# Patient Record
Sex: MALE | Race: OTHER | URBAN - METROPOLITAN AREA
[De-identification: names, ages, dates, MRNs, and addresses within clinical notes are randomized per-mention and may not be internally consistent; named-entity substitution may affect disease eponyms.]

---

## 2017-01-20 ENCOUNTER — OUTPATIENT (OUTPATIENT)
Dept: OUTPATIENT SERVICES | Facility: HOSPITAL | Age: 47
LOS: 1 days | End: 2017-01-20
Payer: OTHER MISCELLANEOUS

## 2017-01-20 PROCEDURE — 73501 X-RAY EXAM HIP UNI 1 VIEW: CPT

## 2017-01-20 PROCEDURE — 73502 X-RAY EXAM HIP UNI 2-3 VIEWS: CPT | Mod: 26

## 2018-07-13 ENCOUNTER — OUTPATIENT (OUTPATIENT)
Dept: OUTPATIENT SERVICES | Facility: HOSPITAL | Age: 48
LOS: 1 days | End: 2018-07-13
Payer: OTHER MISCELLANEOUS

## 2018-07-13 PROCEDURE — 73502 X-RAY EXAM HIP UNI 2-3 VIEWS: CPT | Mod: 26,RT

## 2018-07-13 PROCEDURE — 73502 X-RAY EXAM HIP UNI 2-3 VIEWS: CPT

## 2020-10-01 ENCOUNTER — APPOINTMENT (OUTPATIENT)
Dept: NEUROSURGERY | Facility: CLINIC | Age: 50
End: 2020-10-01

## 2020-11-12 ENCOUNTER — APPOINTMENT (OUTPATIENT)
Dept: NEUROSURGERY | Facility: CLINIC | Age: 50
End: 2020-11-12
Payer: MEDICARE

## 2020-11-12 VITALS
DIASTOLIC BLOOD PRESSURE: 63 MMHG | OXYGEN SATURATION: 95 % | HEIGHT: 63 IN | WEIGHT: 155 LBS | TEMPERATURE: 98.8 F | SYSTOLIC BLOOD PRESSURE: 104 MMHG | HEART RATE: 95 BPM | BODY MASS INDEX: 27.46 KG/M2

## 2020-11-12 DIAGNOSIS — Z78.9 OTHER SPECIFIED HEALTH STATUS: ICD-10-CM

## 2020-11-12 DIAGNOSIS — R52 PAIN, UNSPECIFIED: ICD-10-CM

## 2020-11-12 PROCEDURE — 99203 OFFICE O/P NEW LOW 30 MIN: CPT

## 2020-11-24 PROBLEM — R52 PAIN, ACUTE: Status: ACTIVE | Noted: 2020-11-24

## 2020-11-24 PROBLEM — Z78.9 NO PERTINENT PAST MEDICAL HISTORY: Status: RESOLVED | Noted: 2020-11-24 | Resolved: 2020-11-24

## 2020-11-24 NOTE — HISTORY OF PRESENT ILLNESS
[> 3 months] : more  than 3 months [FreeTextEntry1] : low back pain [de-identified] : He has history of low back and right leg pain. No numbness, no tingling. He has done PT a pain management with no long  lasting benefit. Worse with activity and sitting for too long and changing positions. He is able to walk about 2 to 3 blocks..   He had right knee and hip surgery as well.

## 2020-11-24 NOTE — ASSESSMENT
[FreeTextEntry1] : At this time I think we should concentrate on conservative therapy. We will discuss surgery if the pain gets worse.

## 2020-11-24 NOTE — PHYSICAL EXAM
[No Visual Abnormalities] : no visible abnormailities [No Tenderness to Palpation] : no spine tenderness on palpation [Full ROM] : full ROM [No Pain with ROM] : no pain with motion in any direction [Straight-Leg Raise Test - Left] : straight leg raise of the left leg was positive [Straight-Leg Raise Test - Right] : straight leg raise of the right leg was positive [] : negative on the right [Normal] : normal [Able to toe walk] : the patient was able to toe walk [Able to heel walk] : the patient was able to heel walk [Intact] : all reflexes within normal limits bilaterally [L'Hermitte's] : neck flexion did not produce tingling down the spine/arms [Spurling's - Opposite Side] : Negative Spurling's on opposite side [Spurling's Same Side] : Negative Spurling's on same side

## 2024-04-30 ENCOUNTER — APPOINTMENT (OUTPATIENT)
Dept: RHEUMATOLOGY | Facility: CLINIC | Age: 54
End: 2024-04-30
Payer: MEDICARE

## 2024-04-30 VITALS
SYSTOLIC BLOOD PRESSURE: 124 MMHG | HEART RATE: 64 BPM | BODY MASS INDEX: 26.05 KG/M2 | RESPIRATION RATE: 15 BRPM | DIASTOLIC BLOOD PRESSURE: 77 MMHG | WEIGHT: 147 LBS | TEMPERATURE: 98.9 F | OXYGEN SATURATION: 98 % | HEIGHT: 63 IN

## 2024-04-30 DIAGNOSIS — E78.5 HYPERLIPIDEMIA, UNSPECIFIED: ICD-10-CM

## 2024-04-30 DIAGNOSIS — M25.50 PAIN IN UNSPECIFIED JOINT: ICD-10-CM

## 2024-04-30 DIAGNOSIS — Z78.9 OTHER SPECIFIED HEALTH STATUS: ICD-10-CM

## 2024-04-30 PROCEDURE — 99204 OFFICE O/P NEW MOD 45 MIN: CPT

## 2024-04-30 RX ORDER — CELECOXIB 200 MG/1
200 CAPSULE ORAL
Refills: 0 | Status: ACTIVE | COMMUNITY
Start: 2024-04-30

## 2024-04-30 RX ORDER — ATORVASTATIN CALCIUM 20 MG/1
20 TABLET, FILM COATED ORAL
Refills: 0 | Status: ACTIVE | COMMUNITY
Start: 2024-04-30

## 2024-04-30 RX ORDER — GABAPENTIN 400 MG/1
400 CAPSULE ORAL
Refills: 0 | Status: ACTIVE | COMMUNITY
Start: 2024-04-30

## 2024-04-30 RX ORDER — AMITRIPTYLINE HYDROCHLORIDE 10 MG/1
10 TABLET, FILM COATED ORAL
Refills: 0 | Status: ACTIVE | COMMUNITY
Start: 2024-04-30

## 2024-04-30 RX ORDER — TIZANIDINE 2 MG/1
2 TABLET ORAL
Refills: 0 | Status: ACTIVE | COMMUNITY
Start: 2024-04-30

## 2024-04-30 RX ORDER — PANTOPRAZOLE 40 MG/1
40 TABLET, DELAYED RELEASE ORAL
Refills: 0 | Status: ACTIVE | COMMUNITY
Start: 2024-04-30

## 2024-04-30 NOTE — DATA REVIEWED
[FreeTextEntry1] : MRI lumbar spine reviewed from 7/20 disc bulging  MRI cervical spine reviewed from 3/20 DDD  PET scan reviewed from 4/24 findings w mild increase radionuclide uptake consistent w degenerative joint dz

## 2024-04-30 NOTE — ASSESSMENT
[FreeTextEntry1] : 54 yo M w polyarthralgias =upper back, lower back, MCPs, PIPs, elbows, knees =worse w activities, worse in pm  =dry eyes, dry mouth  =MRI lumbar 7/20 disc bulging  =MRI cervical 3/20 DDD =PET scan 4/24 consistent w degenerative joint dz  Symptoms likely all OA, but will r/o inflammatory/AI dz.   Plan -Labs w serologies, inflammatory markers -Xrays SI joint/ thoracic, hands/wrists, knees RTO depending on above results

## 2024-04-30 NOTE — CONSULT LETTER
[Dear  ___] : Dear  [unfilled], [Consult Letter:] : I had the pleasure of evaluating your patient, [unfilled]. [Consult Closing:] : Thank you very much for allowing me to participate in the care of this patient.  If you have any questions, please do not hesitate to contact me. [Sincerely,] : Sincerely, [FreeTextEntry2] : Dr. Stuart Floyd [phone] [place], [place] [id number] [FreeTextEntry3] : Wei Bill MD

## 2024-04-30 NOTE — HISTORY OF PRESENT ILLNESS
[FreeTextEntry1] : 53 M here for consultation  Pt reports pain in upper back, lower back, MCPs, PIPs, elbows, knees teeth? took abx Difficulty getting up, feel stuck Unsure of stiffness, no swelling Worse w activities; worse in pm Pt has tried gabapentin, tizanidine, celecoxib, which helps patient; although says one of the medications give him dizziness   No fevers, h/a, rashes, hair loss, oral ulcers, epistaxis, sinusitis,  swollen glands,  CP, SOB, cough, vision changes, abdominal pain, GERD, n/v/d, blood in stool or urine, focal weakness, sensory loss, Raynaud's, joint pain, swelling, weight loss.  +dry eyes, uses artificial tears; dry mouth

## 2024-04-30 NOTE — PHYSICAL EXAM
[General Appearance - Well Nourished] : well nourished [General Appearance - Well Developed] : well developed [Sclera] : the sclera and conjunctiva were normal [Auscultation Breath Sounds / Voice Sounds] : lungs were clear to auscultation bilaterally [Heart Rate And Rhythm] : heart rate was normal and rhythm regular [Heart Sounds] : normal S1 and S2 [Murmurs] : no murmurs [Abdomen Tenderness] : non-tender [Cervical Lymph Nodes Enlarged Posterior Bilaterally] : posterior cervical [Cervical Lymph Nodes Enlarged Anterior Bilaterally] : anterior cervical [Supraclavicular Lymph Nodes Enlarged Bilaterally] : supraclavicular [Musculoskeletal - Swelling] : no joint swelling seen [] : no rash [Skin Lesions] : no skin lesions [Oriented To Time, Place, And Person] : oriented to person, place, and time

## 2024-05-06 ENCOUNTER — NON-APPOINTMENT (OUTPATIENT)
Age: 54
End: 2024-05-06

## 2024-05-13 DIAGNOSIS — R74.8 ABNORMAL LEVELS OF OTHER SERUM ENZYMES: ICD-10-CM

## 2024-05-13 LAB
ALBUMIN SERPL ELPH-MCNC: 4.5 G/DL
ALP BLD-CCNC: 87 U/L
ALT SERPL-CCNC: 59 U/L
ANA SER IF-ACNC: NEGATIVE
ANION GAP SERPL CALC-SCNC: 14 MMOL/L
APPEARANCE: CLEAR
AST SERPL-CCNC: 91 U/L
BACTERIA: NEGATIVE /HPF
BASOPHILS # BLD AUTO: 0.05 K/UL
BASOPHILS NFR BLD AUTO: 1.1 %
BILIRUB SERPL-MCNC: 0.3 MG/DL
BILIRUBIN URINE: NEGATIVE
BLOOD URINE: NEGATIVE
BUN SERPL-MCNC: 15 MG/DL
C3 SERPL-MCNC: 123 MG/DL
C4 SERPL-MCNC: 22 MG/DL
CALCIUM SERPL-MCNC: 9.5 MG/DL
CAST: 0 /LPF
CCP AB SER IA-ACNC: <8 UNITS
CHLORIDE SERPL-SCNC: 103 MMOL/L
CK SERPL-CCNC: 3814 U/L
CO2 SERPL-SCNC: 23 MMOL/L
COLOR: YELLOW
CREAT SERPL-MCNC: 0.89 MG/DL
CREAT SPEC-SCNC: 54 MG/DL
CREAT/PROT UR: 0.1 RATIO
CRP SERPL-MCNC: <3 MG/L
DSDNA AB SER-ACNC: 7 IU/ML
EGFR: 104 ML/MIN/1.73M2
ENA RNP AB SER IA-ACNC: <0.2 AL
ENA SM AB SER IA-ACNC: <0.2 AL
ENA SS-A AB SER IA-ACNC: <0.2 AL
ENA SS-B AB SER IA-ACNC: <0.2 AL
EOSINOPHIL # BLD AUTO: 0.13 K/UL
EOSINOPHIL NFR BLD AUTO: 3 %
EPITHELIAL CELLS: 0 /HPF
ERYTHROCYTE [SEDIMENTATION RATE] IN BLOOD BY WESTERGREN METHOD: 12 MM/HR
G6PD SER-CCNC: 11.7 U/G HGB
GLUCOSE QUALITATIVE U: NEGATIVE MG/DL
GLUCOSE SERPL-MCNC: 116 MG/DL
HCT VFR BLD CALC: 39.8 %
HGB BLD-MCNC: 13.9 G/DL
HLA-B27 QL NAA+PROBE: NORMAL
IMM GRANULOCYTES NFR BLD AUTO: 0.2 %
KETONES URINE: NEGATIVE MG/DL
LEUKOCYTE ESTERASE URINE: NEGATIVE
LYMPHOCYTES # BLD AUTO: 2 K/UL
LYMPHOCYTES NFR BLD AUTO: 45.6 %
MAN DIFF?: NORMAL
MCHC RBC-ENTMCNC: 32.2 PG
MCHC RBC-ENTMCNC: 34.9 GM/DL
MCV RBC AUTO: 92.1 FL
MICROSCOPIC-UA: NORMAL
MONOCYTES # BLD AUTO: 0.41 K/UL
MONOCYTES NFR BLD AUTO: 9.3 %
NEUTROPHILS # BLD AUTO: 1.79 K/UL
NEUTROPHILS NFR BLD AUTO: 40.8 %
NITRITE URINE: NEGATIVE
PH URINE: 7
PLATELET # BLD AUTO: 263 K/UL
POTASSIUM SERPL-SCNC: 4 MMOL/L
PROT SERPL-MCNC: 7.5 G/DL
PROT UR-MCNC: 5 MG/DL
PROTEIN URINE: NEGATIVE MG/DL
RBC # BLD: 4.32 M/UL
RBC # FLD: 13.3 %
RED BLOOD CELLS URINE: 0 /HPF
RF+CCP IGG SER-IMP: NEGATIVE
RHEUMATOID FACT SER QL: <10 IU/ML
SODIUM SERPL-SCNC: 140 MMOL/L
SPECIFIC GRAVITY URINE: 1.01
UROBILINOGEN URINE: 0.2 MG/DL
WBC # FLD AUTO: 4.39 K/UL
WHITE BLOOD CELLS URINE: 0 /HPF

## 2024-05-24 DIAGNOSIS — M19.90 UNSPECIFIED OSTEOARTHRITIS, UNSPECIFIED SITE: ICD-10-CM

## 2024-05-24 LAB
CK SERPL-CCNC: 182 U/L
HMGCR ANTIBODY, IGG: <3 UNITS

## 2024-06-06 LAB
EJ AB SER QL: NEGATIVE
ENA JO1 AB SER IA-ACNC: <20 UNITS
ENA PM/SCL AB SER-ACNC: <20 UNITS
ENA SM+RNP AB SER IA-ACNC: <20 UNITS
ENA SS-A IGG SER QL: <20 UNITS
FIBRILLARIN AB SER QL: NEGATIVE
KU AB SER QL: NEGATIVE
MDA-5 (P140)(CADM-140): <20 UNITS
MI2 AB SER QL: NEGATIVE
NXP-2 (P140): <20 UNITS
OJ AB SER QL: NEGATIVE
PL12 AB SER QL: NEGATIVE
PL7 AB SER QL: NEGATIVE
SRP AB SERPL QL: NEGATIVE
TIF GAMMA (P155/140): <20 UNITS
U2 SNRNP AB SER QL: NEGATIVE